# Patient Record
Sex: FEMALE | Race: WHITE | NOT HISPANIC OR LATINO | Employment: OTHER | URBAN - METROPOLITAN AREA
[De-identification: names, ages, dates, MRNs, and addresses within clinical notes are randomized per-mention and may not be internally consistent; named-entity substitution may affect disease eponyms.]

---

## 2017-03-22 RX ORDER — METFORMIN HYDROCHLORIDE 500 MG/1
1000 TABLET ORAL 2 TIMES DAILY WITH MEALS
Qty: 120 TABLET | Refills: 3 | Status: SHIPPED | OUTPATIENT
Start: 2017-03-22

## 2017-05-31 ENCOUNTER — OFFICE VISIT (OUTPATIENT)
Dept: GYNECOLOGIC ONCOLOGY | Facility: CLINIC | Age: 52
End: 2017-05-31
Payer: COMMERCIAL

## 2017-05-31 VITALS
BODY MASS INDEX: 35.94 KG/M2 | HEART RATE: 96 BPM | DIASTOLIC BLOOD PRESSURE: 55 MMHG | SYSTOLIC BLOOD PRESSURE: 113 MMHG | WEIGHT: 184 LBS

## 2017-05-31 DIAGNOSIS — D07.1 VIN III (VULVAR INTRAEPITHELIAL NEOPLASIA III): Primary | ICD-10-CM

## 2017-05-31 PROCEDURE — 99999 PR PBB SHADOW E&M-EST. PATIENT-LVL II: CPT | Mod: PBBFAC,,, | Performed by: OBSTETRICS & GYNECOLOGY

## 2017-05-31 PROCEDURE — 99214 OFFICE O/P EST MOD 30 MIN: CPT | Mod: S$GLB,,, | Performed by: OBSTETRICS & GYNECOLOGY

## 2017-05-31 NOTE — PROGRESS NOTES
Subjective:      Patient ID: Roseann Lemon is a 51 y.o. female.    Chief Complaint: Severe vulvar dysplasia (6 mth f/u)      HPI  Here today for 6 month f/u.  H/O CIS vulva.  Reexcision was normal.  Reports she has been doing well.  No new lesions or vulvar irritation  Review of Systems   Constitutional: Negative for activity change, appetite change, chills, fatigue and fever.   HENT: Negative for hearing loss, mouth sores, nosebleeds, sore throat and tinnitus.    Eyes: Negative for visual disturbance.   Respiratory: Negative for cough, chest tightness, shortness of breath and wheezing.    Cardiovascular: Negative for chest pain and leg swelling.   Gastrointestinal: Negative for abdominal distention, abdominal pain, blood in stool, constipation, diarrhea, nausea and vomiting.   Genitourinary: Negative for dysuria, flank pain, frequency, hematuria, pelvic pain, vaginal bleeding, vaginal discharge and vaginal pain.   Musculoskeletal: Negative for arthralgias and back pain.   Skin: Negative for rash.   Neurological: Negative for dizziness, seizures, syncope, weakness and numbness.   Hematological: Does not bruise/bleed easily.   Psychiatric/Behavioral: Negative for confusion and sleep disturbance. The patient is not nervous/anxious.         Objective:   Physical Exam:   Constitutional: She appears well-developed and well-nourished. No distress.    HENT:   Head: Normocephalic and atraumatic.    Eyes: No scleral icterus.    Neck: Normal range of motion. Neck supple.    Cardiovascular: Normal rate and intact distal pulses.  Exam reveals no cyanosis and no edema.     Pulmonary/Chest: Effort normal. No respiratory distress. She exhibits no tenderness.        Abdominal: Soft. She exhibits no distension, no fluid wave, no ascites and no mass. There is no tenderness. There is no rebound and no guarding. No hernia.     Genitourinary: Rectum normal and vagina normal. Pelvic exam was performed with patient supine. There is  no rash, tenderness or lesion on the right labia. There is no rash, tenderness or lesion on the left labia. Uterus is absent. There is an absent adnexa. Right adnexum displays no mass, no tenderness and no fullness. Left adnexum displays no mass, no tenderness and no fullness. No bleeding or unspecified prolapse of vaginal walls in the vagina. No vaginal discharge found. Vaginal cuff normal.Labial bartholins normal.Cervix exhibits absence.              Lymphadenopathy:     She has no cervical adenopathy.        Right: No inguinal adenopathy present.        Left: No inguinal adenopathy present.     Skin: No cyanosis.        Assessment:     1. BEN III (vulvar intraepithelial neoplasia III)        Plan:       Doing well post-op.  RTC 6 months

## 2017-08-07 ENCOUNTER — OFFICE VISIT (OUTPATIENT)
Dept: DIABETES | Facility: CLINIC | Age: 52
End: 2017-08-07
Payer: COMMERCIAL

## 2017-08-07 VITALS
SYSTOLIC BLOOD PRESSURE: 102 MMHG | BODY MASS INDEX: 36.98 KG/M2 | DIASTOLIC BLOOD PRESSURE: 80 MMHG | HEIGHT: 61 IN | WEIGHT: 195.88 LBS

## 2017-08-07 DIAGNOSIS — E11.9 DIABETES MELLITUS TYPE 2 WITHOUT RETINOPATHY: Primary | ICD-10-CM

## 2017-08-07 DIAGNOSIS — E66.9 OBESITY, UNSPECIFIED OBESITY SEVERITY, UNSPECIFIED OBESITY TYPE: ICD-10-CM

## 2017-08-07 DIAGNOSIS — I15.2 HYPERTENSION ASSOCIATED WITH DIABETES: ICD-10-CM

## 2017-08-07 DIAGNOSIS — E11.59 HYPERTENSION ASSOCIATED WITH DIABETES: ICD-10-CM

## 2017-08-07 LAB — GLUCOSE SERPL-MCNC: 195 MG/DL (ref 70–110)

## 2017-08-07 PROCEDURE — 99999 PR PBB SHADOW E&M-EST. PATIENT-LVL IV: CPT | Mod: PBBFAC,,, | Performed by: NURSE PRACTITIONER

## 2017-08-07 PROCEDURE — 82948 REAGENT STRIP/BLOOD GLUCOSE: CPT | Mod: S$GLB,,, | Performed by: NURSE PRACTITIONER

## 2017-08-07 PROCEDURE — 3044F HG A1C LEVEL LT 7.0%: CPT | Mod: S$GLB,,, | Performed by: NURSE PRACTITIONER

## 2017-08-07 PROCEDURE — 99215 OFFICE O/P EST HI 40 MIN: CPT | Mod: S$GLB,,, | Performed by: NURSE PRACTITIONER

## 2017-08-07 PROCEDURE — 3008F BODY MASS INDEX DOCD: CPT | Mod: S$GLB,,, | Performed by: NURSE PRACTITIONER

## 2017-08-07 RX ORDER — PIOGLITAZONEHYDROCHLORIDE 15 MG/1
15 TABLET ORAL DAILY
COMMUNITY

## 2017-08-07 NOTE — PROGRESS NOTES
"PCP: Norman Crawford MD    Subjective:     Chief Complaint: Diabetes, establish [facility]    HISTORY OF PRESENT ILLNESS: 51 year old  female presenting to establish care for diabetes. Patient has had Type II diabetes since 2012 and has the following complications: none. She  is to be enrolled in diabetes education classes.  Has been on Invokana, glipizide but could not tolerate.  Blood glucose testing is performed regularly.  Per meter, fasting BG are 104 - 159.  She denies any recent hospital admissions, emergency room visits, or hypoglycemia.     Height: 5' 1" (154.9 cm)  //  Weight: 88.9 kg (195 lb 14.1 oz), Body mass index is 37.01 kg/m².  Home Blood Glucose reading this AM: 163 mg/dl fasting.  Her blood sugar in clinic today is: 195 mg/dl at 7:58 pm      Labs Reviewed. ADA recommends A1C of less than 7 %. Her most recent A1C is:     Lab Results   Component Value Date    HGBA1C 6.4 (H) 09/07/2016      DM MEDICATIONS:   Januvia 100 mg daily  Jardiance 25 mg daily  Actos 15 mg daily  Metformin 500 mg, 2 tabs BID    Review of Systems   Constitutional: Negative for appetite change, diaphoresis, fatigue and unexpected weight change.   HENT: Negative for congestion, hearing loss, rhinorrhea, sneezing and sore throat.    Eyes: Negative for visual disturbance.   Respiratory: Negative for cough, shortness of breath and wheezing.    Cardiovascular: Negative for leg swelling.   Gastrointestinal: Negative for abdominal pain, constipation, diarrhea, nausea and vomiting.   Endocrine: Positive for polydipsia (dry mouth). Negative for cold intolerance, heat intolerance, polyphagia and polyuria.   Genitourinary: Negative for difficulty urinating, dysuria and urgency.   Skin: Negative for color change, pallor and wound.   Neurological: Negative for dizziness, seizures, syncope, numbness and headaches.   Psychiatric/Behavioral: Negative for confusion and decreased concentration. The patient is not nervous/anxious.      "   STANDARDS OF CARE:  Current Ophthalmologist / Optometrist: Dr. Ellington, Last exam as noted 10 / 2016.   Current Podiatrist: None.   Recommend regular exams and denies gums bleeding.    ACTIVITY LEVEL: Rarely Active  EXERCISE:  none  MEAL PLANNING: Patient reports number of meals per day to be 3 and number of snacks per day to be 3, such cucumbers, tomatoes, or fruit.  Breakfast can be piper, biscuit OR sausage biscuit OR scalloped potatoes.   Lunch can be left overs.  Dinner can be red beans, rice OR chicken and dumplings OR spaghetti.  Beverages include coke or water. Patient is encouraged to consume 45 - 60 grams of carbohydrates in each meal, and 1800 k / miguel per day.      Per dietary recall, patient is not limiting carbohydrates, saturated fats and sodium.     BLOOD GLUCOSE TESTING:  [email or web address].   SOCIAL HISTORY: .  Lives with family. Retired .  Tobacco use, at least 1 ppd.    Objective:      Physical Exam   Constitutional: She is oriented to person, place, and time. She appears well-developed and well-nourished.   HENT:   Head: Normocephalic and atraumatic.   Eyes: EOM are normal. Pupils are equal, round, and reactive to light.   Neck: Normal range of motion. No tracheal deviation present.   Cardiovascular: Normal rate, regular rhythm and intact distal pulses.  Exam reveals no friction rub.    No murmur heard.  Pulses:       Dorsalis pedis pulses are 2+ on the right side, and 2+ on the left side.   Pulmonary/Chest: Effort normal and breath sounds normal. She has no wheezes.   Abdominal: Soft. Bowel sounds are normal. She exhibits no distension. There is no tenderness.   Musculoskeletal: Normal range of motion. She exhibits no edema.        Left foot: There is no deformity.   Feet:   Right Foot:   Protective Sensation: 6 sites tested. 6 sites sensed.   Skin Integrity: Negative for ulcer, blister, skin breakdown, erythema, warmth, callus or dry skin.   Left Foot:   Protective Sensation:  6 sites tested. 6 sites sensed.   Skin Integrity: Negative for ulcer, blister, skin breakdown, erythema, warmth, callus or dry skin.   Neurological: She is alert and oriented to person, place, and time.   Skin: Skin is warm and dry. No rash noted.   Psychiatric: She has a normal mood and affect. Her behavior is normal. Judgment and thought content normal.       Assessment / Plan:     1.) Diabetes mellitus type 2 without retinopathy  Comments:  - Continue metformin 500 mg, 2 tabs BID.  Continue Jardiance 25 mg daily.  She admits to staying hydrated, denies symptoms of bladder or yeast infection.  Continue Actos 15 mg daily.  Continue Januvia 100 mg daily.  She has been taking her Januvia at  bedtime, rather than in the morning.  She agrees to start taking Januvia in the am.  Diabetes group classes were scheduled.  We discussed switching from Januvia to a GLP-1, such as Trulicity or Victoza.  Patient would like to continue with Januvia for the next 3  weeks and if not improvement in fasting BG, will switch to GLP-1.  She is going decrease her intake of sugary beverages, such as coke.     Orders:  -     POCT glucose    2.) Hypertension associated with diabetes - continue Lisinopril-HCT    3.) Obesity, unspecified obesity severity, unspecified obesity type    Additional Plan Details:    1.) The time was inaccurate on meter, so reset the time. Patient was instructed to monitor blood glucose 2 - 3 x daily, fasting and ac dinner or at bedtime. Discussed ADA goal for fasting blood sugar, 80 - 130 mg/dL; pp blood sugars below 180 mg/dl. Also, discussed prevention of hypoglycemia and the need to adjust goals to higher levels if persistent hypoglycemia.  Reminded to bring blood glucose records or meter to each visit for review.  2.) Reviewed pathophysiology of diabetes, complications related to the disease, importance of annual dilated eye exam and daily foot examination.  3.) We discussed the ADA recommendations: Hemoglobin  A1c below 7.0 %. All patients with diabetes should be on statins unless contraindicated.  ACE or ARB therapy if not contraindicated.    4.) Meal planning teaching: Carbohydrate definition - one serving is 15 gms. Carbohydrate spacing - carbohydrates should be spaced into approximately 3 meals with 2 snacks ( of one carbohydrate ) between meals or at bedtime. Increase vegetable intake to 2 or more cups of vegetables per day as well as 2 fruit servings.  Recommended low saturated fat, low sodium diet to aid in control of hypertension and cholesterol.  5.) Discussed activity, benefits, methods, and precautions.  Recommended patient start or continue some form of exercise and increase as tolerated to 30 minutes per day to facilitate weight loss and aid in control of BGs.  6.) Return to clinic in 3 months for follow up. She was explained the above plan and given opportunity to ask questions.  Advised to call clinic with any further questions or concerns.    Loida Reardon, AXEL-C, CDE    A total of 60 minutes was spent in face to face time, of which over 50 % was spent in counseling patient on disease process, complications, treatment, and side effects of medications.

## 2017-08-22 ENCOUNTER — CLINICAL SUPPORT (OUTPATIENT)
Dept: DIABETES | Facility: CLINIC | Age: 52
End: 2017-08-22
Payer: COMMERCIAL

## 2017-08-22 DIAGNOSIS — E11.9 TYPE 2 DIABETES MELLITUS WITHOUT COMPLICATION, UNSPECIFIED LONG TERM INSULIN USE STATUS: Primary | ICD-10-CM

## 2017-08-22 PROCEDURE — G0109 DIAB MANAGE TRN IND/GROUP: HCPCS | Mod: S$GLB,,, | Performed by: DIETITIAN, REGISTERED

## 2017-08-22 PROCEDURE — 99999 PR PBB SHADOW E&M-EST. PATIENT-LVL II: CPT | Mod: PBBFAC,,,

## 2017-08-22 NOTE — PROGRESS NOTES
"Diabetes Education  Author: Xochilt Aguiar RD, CDE  Date: 8/22/2017    Diabetes Education Visit  Diabetes Education Record Assessment/Progress: Comprehensive/Group    Diabetes Type  Diabetes Type : Type II    Diabetes History  Diabetes Diagnosis: 3-5 years    Nutrition  Meal Planning: 3 meals per day, drinks regular soda, eats out seldom, snacks between meal ("Coke-aholic")    Monitoring   Monitoring: Freestyle Freedom Lite  Self Monitoring : BID - fasting ranging 120-130s; acd 120s  Blood Glucose Logs: No    Exercise   Frequency: Never    Current Diabetes Treatment   Current Treatment: Diet, Oral Medication    Social History  Preferred Learning Method: Face to Face  Primary Support: Self  Occupation: Retired -  - state   Smoking Status: Current Smoker  Alcohol Use: Never    Barriers to Change  Barriers to Change: None  Learning Challenges : None    Readiness to Learn   Readiness to Learn : Acceptance    Cultural Influences  Cultural Influences: No    Diabetes Education Assessment/Progress  Acute Complications (preventing, detecting, and treating acute complications): Discussion, Competent (verbalizes/demonstrates)  Chronic Complications (preventing, detecting, and treating chronic complications): Discussion, Competent (verbalizes/demonstrates)  Diabetes Disease Process (diabetes disease process and treatment options): Discussion, Competent (verbalizes/demonstrates)  Nutrition (Incorporating nutritional management into one's lifestyle): Discussion, Competent (verbalizes/demonstrates)  Physical Activity (incorporating physical activity into one's lifestyle): Discussion, Competent (verbalizes/demonstrates)  Medications (states correct name, dose, onset, peak, duration, side effects & timing of meds): Discussion, Competent (verbalizes/demonstrates)  Monitoring (monitoring blood glucose/other parameters & using results): Discussion, Competent (verbalizes/demonstrates)  Goal Setting and Problem Solving " (verbalizes behavior change strategies & sets realistic goals): Discussion, Competent (verbalizes/demonstrates)  Behavior Change (developing personal strategies to health & behavior change): Discussion, Comnpetent (verbalizes/demonstrates)  Psychosocial Issues (developing personal srategies to address psychosocial concerns): Discussion, Competent (verbalizes/demonstrates)      Diabetes Care Plan/Intervention  Education Plan/Intervention: Individual Follow-Up DSMT  3 month DCF    Education Units of Time   Time Spent: 120 min      Health Maintenance Topics with due status: Not Due       Topic Last Completion Date    Lipid Panel 09/07/2016    Eye Exam 10/17/2016    Foot Exam 08/07/2017     Health Maintenance Due   Topic Date Due    TETANUS VACCINE  11/04/1984    Pneumococcal PPSV23 (Medium Risk) (1) 11/04/1984    Colonoscopy  11/04/2016    Hemoglobin A1c  03/07/2017    Influenza Vaccine  08/01/2017    Mammogram  09/08/2017

## 2017-08-22 NOTE — PROGRESS NOTES
"Diabetes Education  Author: Xochilt Aguiar RD, CDE  Date: 8/22/2017    Referring Provider: Loida Reardon*  51 y.o. female in clinic today for diabetes education. T2DM diagnosis in 2012. Patient is currently taking Jardiance 25 mg daily, metformin 1000 mg BID, Actos 15 mg daily, Januvia 50 mg daily for diabetes. Last A1c:   A1C:   Hemoglobin A1C   Date Value Ref Range Status   09/07/2016 6.4 (H) 4.8 - 5.6 % Final     Comment:              Pre-diabetes: 5.7 - 6.4           Diabetes: >6.4           Glycemic control for adults with diabetes: <7.0         Diabetes Education Visit  Diabetes Education Record Assessment/Progress: Initial    Diabetes Type  Diabetes Type : Type II    Diabetes History  Diabetes Diagnosis: 3-5 years    Nutrition  Meal Planning: 3 meals per day, drinks regular soda, eats out seldom, snacks between meal ("Coke-aholic")    Monitoring   Monitoring: Freestyle Freedom Lite  Self Monitoring : BID - fasting ranging 120-130s; acd 120s  Blood Glucose Logs: No    Exercise   Frequency: Never    Current Diabetes Treatment   Current Treatment: Diet, Oral Medication    Social History  Preferred Learning Method: Face to Face  Primary Support: Self  Occupation: Retired -  - state   Smoking Status: Current Smoker  Alcohol Use: Never    Barriers to Change  Barriers to Change: None  Learning Challenges : None    Readiness to Learn   Readiness to Learn : Acceptance    Cultural Influences  Cultural Influences: No    Diabetes Education Assessment/Progress  Acute Complications (preventing, detecting, and treating acute complications): Discussion  Chronic Complications (preventing, detecting, and treating chronic complications): Discussion  Diabetes Disease Process (diabetes disease process and treatment options): Discussion  Nutrition (Incorporating nutritional management into one's lifestyle): Discussion  Physical Activity (incorporating physical activity into one's lifestyle): " Discussion  Medications (states correct name, dose, onset, peak, duration, side effects & timing of meds): Discussion  Monitoring (monitoring blood glucose/other parameters & using results): Discussion  Goal Setting and Problem Solving (verbalizes behavior change strategies & sets realistic goals): Discussion  Behavior Change (developing personal strategies to health & behavior change): Discussion  Psychosocial Issues (developing personal srategies to address psychosocial concerns): Discussion    Diabetes Care Plan/Intervention  Education Plan/Intervention: Individual Follow-Up DSMT  3 mo f/u with RD/CDE for continued mgt/support.     Education Units of Time   Time Spent: 60 min      Health Maintenance Topics with due status: Not Due       Topic Last Completion Date    Lipid Panel 09/07/2016    Eye Exam 10/17/2016    Foot Exam 08/07/2017     Health Maintenance Due   Topic Date Due    TETANUS VACCINE  11/04/1984    Pneumococcal PPSV23 (Medium Risk) (1) 11/04/1984    Colonoscopy  11/04/2016    Hemoglobin A1c  03/07/2017    Influenza Vaccine  08/01/2017    Mammogram  09/08/2017

## 2017-09-12 ENCOUNTER — TELEPHONE (OUTPATIENT)
Dept: INTERNAL MEDICINE | Facility: CLINIC | Age: 52
End: 2017-09-12

## 2017-09-12 NOTE — TELEPHONE ENCOUNTER
----- Message from Jazzmine Krishnamurthy sent at 9/12/2017  7:46 AM CDT -----  Contact: self [phone]  States that she thinks she may have bronchitis and would like to be worked in for an appt for today. Pt refused to see Stephanie Rai and does not want to go to another location. Please call back at [phone]//thank you acc

## 2017-10-10 ENCOUNTER — CLINICAL SUPPORT (OUTPATIENT)
Dept: SMOKING CESSATION | Facility: CLINIC | Age: 52
End: 2017-10-10
Payer: COMMERCIAL

## 2017-10-10 DIAGNOSIS — F17.200 NICOTINE DEPENDENCE: Primary | ICD-10-CM

## 2017-10-10 PROCEDURE — 99407 BEHAV CHNG SMOKING > 10 MIN: CPT | Mod: S$GLB,,,

## 2017-10-23 ENCOUNTER — OFFICE VISIT (OUTPATIENT)
Dept: OPHTHALMOLOGY | Facility: CLINIC | Age: 52
End: 2017-10-23
Payer: COMMERCIAL

## 2017-10-23 DIAGNOSIS — E11.9 DIABETES MELLITUS TYPE 2 WITHOUT RETINOPATHY: Primary | ICD-10-CM

## 2017-10-23 DIAGNOSIS — H25.11 NUCLEAR SCLEROSIS OF RIGHT EYE: ICD-10-CM

## 2017-10-23 PROCEDURE — 92014 COMPRE OPH EXAM EST PT 1/>: CPT | Mod: S$GLB,,, | Performed by: OPHTHALMOLOGY

## 2017-10-23 PROCEDURE — 99999 PR PBB SHADOW E&M-EST. PATIENT-LVL I: CPT | Mod: PBBFAC,,, | Performed by: OPHTHALMOLOGY

## 2017-10-23 NOTE — LETTER
[facility] - Ophthalmology  [address]  [place] [address]  Phone: [phone]  Fax: [phone]   October 23, 2017    Robert Sims Jr., MD  [address] [address]    Patient: Roseann Lemon   MR Number: [id number]   YOB: 1965   Date of Visit: 10/23/2017       Dear Dr. Sims :    Thank you for referring Roseann Lemon to me for evaluation. Here is my assessment and plan of care:            If you have questions, please do not hesitate to call me. I look forward to following Ms. Roseann Lemon along with you.    Sincerely,        Ish Ellington MD       CC  No Recipients         
No

## 2017-10-23 NOTE — PROGRESS NOTES
SUBJECTIVE:   Roseann Lemon is a 51 y.o. female   Corrected distance visual acuity was 20/20 -2 in the right eye and 20/25 in the left eye.   Chief Complaint   Patient presents with    Diabetic Eye Exam     yearly exam. last A1C was 7.1 october 1st. bs 160 this am    Blurred Vision     vision seem to be getting blurrer. would like to discuss laser surgery or catarct surgery        HPI:  HPI     Diabetic Eye Exam    Additional comments: yearly exam. last A1C was 7.1 october 1st. bs 160   this am           Blurred Vision    Additional comments: vision seem to be getting blurrer. would like to   discuss laser surgery or catarct surgery           Comments   1. H/o traumatic mydriasis, cataract, and iritis OD  2. DM no DR x 2012  3. Asthenopia       Last edited by Ynes Liu MA on 10/23/2017  8:08 AM. (History)        Assessment /Plan :  1. Diabetes mellitus type 2 without retinopathy No diabetic retinopathy at this time. Reviewed diabetic eye precautions including avoiding tobacco use,  Good glucose control, and importance of regular follow up.      2. Nuclear sclerosis of right eye Nuclear sclerotic cataract - not visually significant. Observe.         RTC 1 year

## 2018-08-20 ENCOUNTER — OFFICE VISIT (OUTPATIENT)
Dept: OPHTHALMOLOGY | Facility: CLINIC | Age: 53
End: 2018-08-20
Payer: COMMERCIAL

## 2018-08-20 ENCOUNTER — TELEPHONE (OUTPATIENT)
Dept: OPHTHALMOLOGY | Facility: CLINIC | Age: 53
End: 2018-08-20

## 2018-08-20 DIAGNOSIS — H57.04 TRAUMATIC MYDRIASIS: ICD-10-CM

## 2018-08-20 DIAGNOSIS — H25.13 SENILE NUCLEAR CATARACT, BILATERAL: ICD-10-CM

## 2018-08-20 DIAGNOSIS — E11.9 DIABETES MELLITUS TYPE 2 WITHOUT RETINOPATHY: Primary | ICD-10-CM

## 2018-08-20 PROCEDURE — 99999 PR PBB SHADOW E&M-EST. PATIENT-LVL II: CPT | Mod: PBBFAC,,, | Performed by: OPHTHALMOLOGY

## 2018-08-20 PROCEDURE — 92014 COMPRE OPH EXAM EST PT 1/>: CPT | Mod: S$GLB,,, | Performed by: OPHTHALMOLOGY

## 2018-08-20 NOTE — TELEPHONE ENCOUNTER
Pt's last spec Rx is from .  Spoke with pt and advised that current Rx is .  She stepped on her glasses and broke them.  Offered appt for refraction.  She would like to have full DM exam.  Appt scheduled with Dr. Ellington today.

## 2018-08-20 NOTE — TELEPHONE ENCOUNTER
----- Message from Kiara Delgadillo sent at 8/20/2018  8:13 AM CDT -----  Contact: pt  States she needs to get a prescription for her glasses and she needs to get distance between her pupils measured. Please call pt at [phone]. Thank you

## 2018-08-20 NOTE — PROGRESS NOTES
SUBJECTIVE:   Roseann Lemon is a 52 y.o. female   Corrected distance visual acuity was 20/20 -2 in the right eye and 20/20 in the left eye.   Chief Complaint   Patient presents with    Diabetic Eye Exam     yearly dm. broke glasses.         HPI:  HPI     Diabetic Eye Exam      Additional comments: yearly dm. broke glasses.               Comments     1. H/o traumatic mydriasis, cataract, and iritis OD  2. DM no DR x 2012  3. Asthenopia          Last edited by Ynes Souza MA on 8/20/2018  1:51 PM. (History)        Assessment /Plan :  1. Diabetes mellitus type 2 without retinopathy No diabetic retinopathy at this time. Reviewed diabetic eye precautions including avoiding tobacco use,  Good glucose control, and importance of regular follow up.      2. Senile nuclear cataract, bilateral Nuclear sclerotic cataract - not visually significant. Observe.     3. Traumatic mydriasis - Right Eye  -- Condition stable, no therapeutic change required. Monitoring routinely.       RTC 1 year

## 2018-08-20 NOTE — LETTER
[facility] - Ophthalmology  [address] [place] [address]  Phone: [phone]  Fax: [phone]   August 20, 2018    Robert Sims Jr., MD  [address] [address]    Patient: Roseann Lemon   MR Number: [id number]   YOB: 1965   Date of Visit: 8/20/2018       Dear Dr. Sims :    Thank you for referring Roseann Lemon to me for evaluation. Here is my assessment and plan of care:     /   :  1. Diabetes mellitus type 2 without retinopathy No diabetic retinopathy at this time. Reviewed diabetic eye precautions including avoiding tobacco use,  Good glucose control, and importance of regular follow up.      2. Senile nuclear cataract, bilateral Nuclear sclerotic cataract - not visually significant. Observe.     3. Traumatic mydriasis - Right Eye  -- Condition stable, no therapeutic change required. Monitoring routinely.       RTC 1 year                 If you have questions, please do not hesitate to call me. I look forward to following Ms. Roseann Lemon along with you.    Sincerely,        Ish Ellington MD       CC  No Recipients

## 2019-08-22 ENCOUNTER — OFFICE VISIT (OUTPATIENT)
Dept: OPHTHALMOLOGY | Facility: CLINIC | Age: 54
End: 2019-08-22
Payer: COMMERCIAL

## 2019-08-22 DIAGNOSIS — H57.04 TRAUMATIC MYDRIASIS: ICD-10-CM

## 2019-08-22 DIAGNOSIS — H25.011 CATARACT CORTICAL, SENILE, RIGHT: ICD-10-CM

## 2019-08-22 DIAGNOSIS — H25.13 SENILE NUCLEAR CATARACT, BILATERAL: ICD-10-CM

## 2019-08-22 DIAGNOSIS — E11.9 DIABETES MELLITUS TYPE 2 WITHOUT RETINOPATHY: Primary | ICD-10-CM

## 2019-08-22 PROCEDURE — 92014 PR EYE EXAM, EST PATIENT,COMPREHESV: ICD-10-PCS | Mod: S$GLB,,, | Performed by: OPHTHALMOLOGY

## 2019-08-22 PROCEDURE — 99999 PR PBB SHADOW E&M-EST. PATIENT-LVL II: ICD-10-PCS | Mod: PBBFAC,,, | Performed by: OPHTHALMOLOGY

## 2019-08-22 PROCEDURE — 99999 PR PBB SHADOW E&M-EST. PATIENT-LVL II: CPT | Mod: PBBFAC,,, | Performed by: OPHTHALMOLOGY

## 2019-08-22 PROCEDURE — 92014 COMPRE OPH EXAM EST PT 1/>: CPT | Mod: S$GLB,,, | Performed by: OPHTHALMOLOGY

## 2019-08-22 RX ORDER — KETOROLAC TROMETHAMINE 5 MG/ML
1 SOLUTION OPHTHALMIC 4 TIMES DAILY
Qty: 1 BOTTLE | Refills: 2 | Status: SHIPPED | OUTPATIENT
Start: 2019-08-22

## 2019-08-22 RX ORDER — PREDNISOLONE ACETATE 10 MG/ML
1 SUSPENSION/ DROPS OPHTHALMIC 4 TIMES DAILY
Qty: 1 BOTTLE | Refills: 2 | Status: SHIPPED | OUTPATIENT
Start: 2019-08-22

## 2019-08-22 RX ORDER — GATIFLOXACIN 5 MG/ML
1 SOLUTION/ DROPS OPHTHALMIC 2 TIMES DAILY
Qty: 1 BOTTLE | Refills: 2 | Status: SHIPPED | OUTPATIENT
Start: 2019-08-22

## 2019-08-22 NOTE — LETTER
[facility] Ophthalmology  [address] [address]  Phone: [phone]  Fax: [phone]   August 22, 2019    Robert Sims Jr., MD  [address]  [facility] [address]    Patient: Roseann Lemon   MR Number: [id number]   YOB: 1965   Date of Visit: 8/22/2019       Dear Dr. Sims :    Thank you for referring Roseann Lemon to me for evaluation. Here is my assessment and plan of care:            If you have questions, please do not hesitate to call me. I look forward to following Ms. Roseann Lemon along with you.    Sincerely,        Ish Ellington MD       CC  No Recipients

## 2019-08-22 NOTE — PROGRESS NOTES
SUBJECTIVE:   Roseann Lemon is a 53 y.o. female   Corrected distance visual acuity was 20/25 in the right eye and 20/20 -1 in the left eye.   Chief Complaint   Patient presents with    Diabetic Eye Exam        HPI:  HPI     The patient is here for her annual DB eye exam. The patient states her   diabetes have been doing great and her last A1C was 6.1. The patient   states her eyes are doing good and she denies any ocular pain at this   time.    PCP: Robert Sims      1. H/o traumatic mydriasis, cataract, and iritis OD  2. DM no DR x 2012  3. Asthenopia    Last edited by Isablel Ramos on 8/22/2019  9:44 AM. (History)        Assessment /Plan :  1. Diabetes mellitus type 2 without retinopathy No diabetic retinopathy at this time. Reviewed diabetic eye precautions including avoiding tobacco use,  Good glucose control, and importance of regular follow up.      2. Cataract cortical, senile, right  Visually Significant Cataract OD:   Patient reports decreased vision consistent with the clinical amount of lenticular opacity,  which reaches the level of visual significance and affects activities of daily living such as  read. Risks, benefits, and alternatives to cataract surgery were  discussed.  IOL options were discussed, including Premium IOL'S and the associated  side effects and additional patient cost associated with them as well as patient's visual  goals. The pt expressed a desire to proceed with surgery with the potential for some  reasonable degree of visual improvement and was consented.  Risks of loss of vision and eye reviewed as well as possibility of need for spectacle correction after surgery even with premium implants. Verbal and written preop  instructions were provided to the patient.       Pt is interested in traditional monofocal IOL aiming for:    Distance OU and understands that glasses will be generally needed at all times for near vision and often for finer distance correction especially for  higher degrees of astigmatism.       Final visual acuity may be limited by astigmatism and diabetes and mydriasis    Pt wishes to have Phaco/IOL  OD     Requests a Monofocal IOL.    Will aim for distance    Other considerations: None       3. Senile nuclear cataract, bilateral    4. Traumatic mydriasis - Right Eye  -- Condition stable, no therapeutic change required. Monitoring routinely.

## 2019-08-23 ENCOUNTER — TELEPHONE (OUTPATIENT)
Dept: OPHTHALMOLOGY | Facility: CLINIC | Age: 54
End: 2019-08-23

## 2019-08-23 NOTE — TELEPHONE ENCOUNTER
----- Message from Crystal Roberts sent at 8/23/2019  1:48 PM CDT -----  Contact:  [facility]rl[facility]-[phone]        .Type:  Pharmacy Calling to Clarify an RX     Name of Caller:Fatemeh   Pharmacy Name:[facility]   Prescription Name:Gatifloxacin 1 drop right eye 2xday   What do they need to clarify?: pt is allergic to medication on profile. Would like to compare in the pt's chart   Best Call Back Number:[phone]     Additional Information: .   [facility] - [place] - [address]   [address]   [place] [address]   Phone: [phone] Fax: [phone]

## 2019-08-23 NOTE — TELEPHONE ENCOUNTER
Pharmacy doesn't want to fill Rx for gatofloxacin due to pt history of cipro allergy that caused facial swelling.  They want approval or alternative prescribed by Dr. Ellington.  Phoned pt to let her know that we will consult Dr. Ellington on Monday.

## 2019-08-26 ENCOUNTER — TELEPHONE (OUTPATIENT)
Dept: OPHTHALMOLOGY | Facility: CLINIC | Age: 54
End: 2019-08-26

## 2019-08-26 NOTE — TELEPHONE ENCOUNTER
----- Message from Crystal Roberts sent at 8/23/2019  1:48 PM CDT -----  Contact:  [facility]-[phone]        .Type:  Pharmacy Calling to Clarify an RX     Name of Caller:Fatemeh   Pharmacy Name:[facility]   Prescription Name:Gatifloxacin 1 drop right eye 2xday   What do they need to clarify?: pt is allergic to medication on profile. Would like to compare in the pt's chart   Best Call Back Number:[phone]     Additional Information: .   [facility] - [place] - [address]   [address]   [place] [address]   Phone: [phone] Fax: [phone]

## 2019-08-26 NOTE — TELEPHONE ENCOUNTER
Spoke with pharmacist.  Dr. Ellington will substitute Polytrim for Gatofloxacin due to patien't negative reaction to quinolones.

## 2019-08-26 NOTE — TELEPHONE ENCOUNTER
----- Message from Crystal Roberts sent at 8/23/2019  1:48 PM CDT -----  Contact:  [facility]-[phone]        .Type:  Pharmacy Calling to Clarify an RX     Name of Caller:Fatemeh   Pharmacy Name:[facility]   Prescription Name:Gatifloxacin 1 drop right eye 2xday   What do they need to clarify?: pt is allergic to medication on profile. Would like to compare in the pt's chart   Best Call Back Number:[phone]     Additional Information: .   [facility] - [place] - [address]   [address]   [place] [address]   Phone: [phone] Fax: [phone]

## 2019-09-18 ENCOUNTER — OUTSIDE PLACE OF SERVICE (OUTPATIENT)
Dept: ADMINISTRATIVE | Facility: OTHER | Age: 54
End: 2019-09-18
Payer: COMMERCIAL

## 2019-09-18 PROCEDURE — 66984 PR REMOVAL, CATARACT, W/INSRT INTRAOC LENS, W/O ENDO CYCLO: ICD-10-PCS | Mod: RT,,, | Performed by: OPHTHALMOLOGY

## 2019-09-18 PROCEDURE — 66984 XCAPSL CTRC RMVL W/O ECP: CPT | Mod: RT,,, | Performed by: OPHTHALMOLOGY

## 2019-09-19 ENCOUNTER — OFFICE VISIT (OUTPATIENT)
Dept: OPHTHALMOLOGY | Facility: CLINIC | Age: 54
End: 2019-09-19
Payer: COMMERCIAL

## 2019-09-19 DIAGNOSIS — Z98.890 POST-OPERATIVE STATE: Primary | ICD-10-CM

## 2019-09-19 PROCEDURE — 99999 PR PBB SHADOW E&M-EST. PATIENT-LVL II: ICD-10-PCS | Mod: PBBFAC,,, | Performed by: OPHTHALMOLOGY

## 2019-09-19 PROCEDURE — 99024 POSTOP FOLLOW-UP VISIT: CPT | Mod: S$GLB,,, | Performed by: OPHTHALMOLOGY

## 2019-09-19 PROCEDURE — 99999 PR PBB SHADOW E&M-EST. PATIENT-LVL II: CPT | Mod: PBBFAC,,, | Performed by: OPHTHALMOLOGY

## 2019-09-19 PROCEDURE — 99024 PR POST-OP FOLLOW-UP VISIT: ICD-10-PCS | Mod: S$GLB,,, | Performed by: OPHTHALMOLOGY

## 2019-09-25 ENCOUNTER — OFFICE VISIT (OUTPATIENT)
Dept: OPHTHALMOLOGY | Facility: CLINIC | Age: 54
End: 2019-09-25
Payer: COMMERCIAL

## 2019-09-25 DIAGNOSIS — Z98.890 POST-OPERATIVE STATE: Primary | ICD-10-CM

## 2019-09-25 PROCEDURE — 99024 POSTOP FOLLOW-UP VISIT: CPT | Mod: S$GLB,,, | Performed by: OPHTHALMOLOGY

## 2019-09-25 PROCEDURE — 99024 PR POST-OP FOLLOW-UP VISIT: ICD-10-PCS | Mod: S$GLB,,, | Performed by: OPHTHALMOLOGY

## 2019-09-25 PROCEDURE — 99999 PR PBB SHADOW E&M-EST. PATIENT-LVL II: ICD-10-PCS | Mod: PBBFAC,,, | Performed by: OPHTHALMOLOGY

## 2019-09-25 PROCEDURE — 99999 PR PBB SHADOW E&M-EST. PATIENT-LVL II: CPT | Mod: PBBFAC,,, | Performed by: OPHTHALMOLOGY

## 2019-09-25 NOTE — PROGRESS NOTES
SUBJECTIVE:   Roseann Hollingsworth is a 53 y.o. female   Uncorrected distance visual acuity was 20/20 in the right eye and not recorded in the left eye.   Chief Complaint   Patient presents with    Post-op Evaluation        HPI:  HPI     1 week phaco OD per pt doing great no complaints    Last edited by Tequila Sen MA on 9/25/2019  2:59 PM. (History)        Assessment /Plan :  1. Post-operative state Slit lamp exam:  L/L: nl  K: clear, wound sealed  AC: 0 cell and flare  Iris/Lens: IOL centered and stable      IMP:  PO Week 1 S/P Phaco/ IOL OD : Doing well with no evidence of infection or abnormal inflammation.     Plan:  Pt given and instructed in one week PO instructions. D/C zymaxid and start to taper off Ketorlac and Pred Forte 1% weekly. Can resume normal activitites and d/c eye shield. OTC reading glasses can be used until evaluated for final MR. Follow up in one month or PRN pain, redness, vision loss, or other concerns.

## 2019-10-28 ENCOUNTER — OFFICE VISIT (OUTPATIENT)
Dept: OPHTHALMOLOGY | Facility: CLINIC | Age: 54
End: 2019-10-28
Payer: COMMERCIAL

## 2019-10-28 DIAGNOSIS — Z98.890 POST-OPERATIVE STATE: Primary | ICD-10-CM

## 2019-10-28 PROCEDURE — 99999 PR PBB SHADOW E&M-EST. PATIENT-LVL II: ICD-10-PCS | Mod: PBBFAC,,, | Performed by: OPHTHALMOLOGY

## 2019-10-28 PROCEDURE — 99999 PR PBB SHADOW E&M-EST. PATIENT-LVL II: CPT | Mod: PBBFAC,,, | Performed by: OPHTHALMOLOGY

## 2019-10-28 PROCEDURE — 99024 POSTOP FOLLOW-UP VISIT: CPT | Mod: S$GLB,,, | Performed by: OPHTHALMOLOGY

## 2019-10-28 PROCEDURE — 99024 PR POST-OP FOLLOW-UP VISIT: ICD-10-PCS | Mod: S$GLB,,, | Performed by: OPHTHALMOLOGY

## 2019-10-28 NOTE — PROGRESS NOTES
SUBJECTIVE:   Filomena Hollingsworth is a 53 y.o. female   Uncorrected distance visual acuity was 20/20 -1 in the right eye and not recorded in the left eye. Corrected distance visual acuity was not recorded in the right eye and 20/20 in the left eye.   Chief Complaint   Patient presents with    Post-op Evaluation     5 wk s/p phaco OD. doing well, eye feels dry         HPI:  HPI     Post-op Evaluation      Additional comments: 5 wk s/p phaco OD. doing well, eye feels dry               Comments     Pt states she notices a big difference in vision between right eye and   left eye.     1. H/o traumatic mydriasis, cataract, and iritis OD X 2010  PCIOL OD (+25.0 SN60WF Distance)9/18/19   2. DM no DR x 2012  3. Asthenopia          Last edited by Ynes Souza MA on 10/28/2019  9:11 AM. (History)        Assessment /Plan :  1. Post-operative state   Exam:    Slit lamp exam:  L/L: nl  S/C: quiet and uninflamed  K: clear, wound sealed  AC: no cell or flare  Iris/Lens: IOL centered and stable      Assessment:    PO Month 1 S/P Phaco/IOL OD : Doing Well and completing healing process. Final visual correction options discussed and appropriate prescriptions and/or OTC reading glass recommendations offered to patient. Pt desires PAL Rx. Pt instructed to follow up with Dr. Ellington in 6 months for next eye exam or PRN any pain, redness, vision changes or other concerns.

## 2020-06-15 ENCOUNTER — OFFICE VISIT (OUTPATIENT)
Dept: OPHTHALMOLOGY | Facility: CLINIC | Age: 55
End: 2020-06-15
Payer: COMMERCIAL

## 2020-06-15 DIAGNOSIS — E11.9 DIABETES MELLITUS TYPE 2 WITHOUT RETINOPATHY: Primary | ICD-10-CM

## 2020-06-15 DIAGNOSIS — H25.12 NUCLEAR SCLEROSIS OF LEFT EYE: ICD-10-CM

## 2020-06-15 DIAGNOSIS — Z96.1 PSEUDOPHAKIA OF RIGHT EYE: ICD-10-CM

## 2020-06-15 DIAGNOSIS — H52.7 REFRACTIVE ERROR: ICD-10-CM

## 2020-06-15 PROCEDURE — 92014 COMPRE OPH EXAM EST PT 1/>: CPT | Mod: S$GLB,,, | Performed by: OPHTHALMOLOGY

## 2020-06-15 PROCEDURE — 92015 DETERMINE REFRACTIVE STATE: CPT | Mod: S$GLB,,, | Performed by: OPHTHALMOLOGY

## 2020-06-15 PROCEDURE — 99999 PR PBB SHADOW E&M-EST. PATIENT-LVL III: CPT | Mod: PBBFAC,,, | Performed by: OPHTHALMOLOGY

## 2020-06-15 PROCEDURE — 92015 PR REFRACTION: ICD-10-PCS | Mod: S$GLB,,, | Performed by: OPHTHALMOLOGY

## 2020-06-15 PROCEDURE — 99999 PR PBB SHADOW E&M-EST. PATIENT-LVL III: ICD-10-PCS | Mod: PBBFAC,,, | Performed by: OPHTHALMOLOGY

## 2020-06-15 PROCEDURE — 92014 PR EYE EXAM, EST PATIENT,COMPREHESV: ICD-10-PCS | Mod: S$GLB,,, | Performed by: OPHTHALMOLOGY

## 2020-06-15 NOTE — LETTER
[facility]  [address] [facility] [address] [facility] [address]  Phone: [phone]  Fax: [phone]   Chantell 15, 2020    Robert Sims Jr., MD  [address]  [place] [address]    Patient: Filomena Hollingsworth   MR Number: [id number]   YOB: 1965   Date of Visit: 6/15/2020       Dear Dr. Sims :    Thank you for referring Filomena Hollingsworth to me for evaluation. Here is my assessment and plan of care:    Assessment  /Plan :  1. Diabetes mellitus type 2 without retinopathy No diabetic retinopathy at this time. Reviewed diabetic eye precautions including avoiding tobacco use,  Good glucose control, and importance of regular follow up.      2. Nuclear sclerosis of left eye monitor for now   3. Pseudophakia of right eye  -- Condition stable, no therapeutic change required. Monitoring routinely.     4. Refractive error PAL Rx     RTC in 1 year or prn any changes        If you have questions, please do not hesitate to call me. I look forward to following Ms. Filomena Hollingsworth along with you.    Sincerely,        Ish Ellington MD       CC  No Recipients

## 2020-06-15 NOTE — PROGRESS NOTES
SUBJECTIVE  Filomenamaida Hollingsworth is 54 y.o. female  Corrected distance visual acuity was 20/20 -1 in the right eye and 20/20 -1 in the left eye.   Chief Complaint   Patient presents with    Eye Exam          HPI     The patient states that since surgery she has been feeling as though her   eyes cross and she is also seeing shadows out the corner of her eyes. She   experiences aching pains in her eyes when she is sitting watching tv.    PCP: Robert Sims([place])      1. H/o traumatic mydriasis, cataract, and iritis OD X 2010  PCIOL OD (+25.0 SN60WF Distance)9/18/19   2. DM no DR x 2012  3. Asthenopia    Last edited by Bethany Portillo on 6/15/2020  8:43 AM. (History)         Assessment /Plan :  1. Diabetes mellitus type 2 without retinopathy No diabetic retinopathy at this time. Reviewed diabetic eye precautions including avoiding tobacco use,  Good glucose control, and importance of regular follow up.      2. Nuclear sclerosis of left eye monitor for now   3. Pseudophakia of right eye  -- Condition stable, no therapeutic change required. Monitoring routinely.     4. Refractive error PAL Rx     RTC in 1 year or prn any changes

## 2021-04-28 ENCOUNTER — PATIENT MESSAGE (OUTPATIENT)
Dept: RESEARCH | Facility: HOSPITAL | Age: 56
End: 2021-04-28

## 2021-06-17 ENCOUNTER — OFFICE VISIT (OUTPATIENT)
Dept: OPHTHALMOLOGY | Facility: CLINIC | Age: 56
End: 2021-06-17
Payer: COMMERCIAL

## 2021-06-17 DIAGNOSIS — H57.04 TRAUMATIC MYDRIASIS: ICD-10-CM

## 2021-06-17 DIAGNOSIS — Z96.1 PSEUDOPHAKIA OF RIGHT EYE: ICD-10-CM

## 2021-06-17 DIAGNOSIS — H25.12 NUCLEAR SCLEROSIS OF LEFT EYE: ICD-10-CM

## 2021-06-17 DIAGNOSIS — E11.9 DIABETES MELLITUS TYPE 2 WITHOUT RETINOPATHY: Primary | ICD-10-CM

## 2021-06-17 PROCEDURE — 2023F DILAT RTA XM W/O RTNOPTHY: CPT | Mod: S$GLB,,, | Performed by: OPHTHALMOLOGY

## 2021-06-17 PROCEDURE — 92014 COMPRE OPH EXAM EST PT 1/>: CPT | Mod: S$GLB,,, | Performed by: OPHTHALMOLOGY

## 2021-06-17 PROCEDURE — 2023F PR DILATED RETINAL EXAM W/O EVID OF RETINOPATHY: ICD-10-PCS | Mod: S$GLB,,, | Performed by: OPHTHALMOLOGY

## 2021-06-17 PROCEDURE — 99999 PR PBB SHADOW E&M-EST. PATIENT-LVL III: ICD-10-PCS | Mod: PBBFAC,,, | Performed by: OPHTHALMOLOGY

## 2021-06-17 PROCEDURE — 92014 PR EYE EXAM, EST PATIENT,COMPREHESV: ICD-10-PCS | Mod: S$GLB,,, | Performed by: OPHTHALMOLOGY

## 2021-06-17 PROCEDURE — 99999 PR PBB SHADOW E&M-EST. PATIENT-LVL III: CPT | Mod: PBBFAC,,, | Performed by: OPHTHALMOLOGY

## 2022-07-11 ENCOUNTER — OFFICE VISIT (OUTPATIENT)
Dept: OPHTHALMOLOGY | Facility: CLINIC | Age: 57
End: 2022-07-11
Payer: COMMERCIAL

## 2022-07-11 DIAGNOSIS — H57.813 BROW PTOSIS, BILATERAL: ICD-10-CM

## 2022-07-11 DIAGNOSIS — H02.834 DERMATOCHALASIS OF BOTH UPPER EYELIDS: ICD-10-CM

## 2022-07-11 DIAGNOSIS — Z96.1 PSEUDOPHAKIA OF RIGHT EYE: ICD-10-CM

## 2022-07-11 DIAGNOSIS — H02.831 DERMATOCHALASIS OF BOTH UPPER EYELIDS: ICD-10-CM

## 2022-07-11 DIAGNOSIS — E11.9 DIABETES MELLITUS TYPE 2 WITHOUT RETINOPATHY: Primary | ICD-10-CM

## 2022-07-11 DIAGNOSIS — H57.04 TRAUMATIC MYDRIASIS: ICD-10-CM

## 2022-07-11 DIAGNOSIS — H25.12 NUCLEAR SCLEROSIS OF LEFT EYE: ICD-10-CM

## 2022-07-11 PROCEDURE — 92014 COMPRE OPH EXAM EST PT 1/>: CPT | Mod: S$GLB,,, | Performed by: OPHTHALMOLOGY

## 2022-07-11 PROCEDURE — 1159F MED LIST DOCD IN RCRD: CPT | Mod: CPTII,S$GLB,, | Performed by: OPHTHALMOLOGY

## 2022-07-11 PROCEDURE — 99999 PR PBB SHADOW E&M-EST. PATIENT-LVL II: CPT | Mod: PBBFAC,,, | Performed by: OPHTHALMOLOGY

## 2022-07-11 PROCEDURE — 1160F PR REVIEW ALL MEDS BY PRESCRIBER/CLIN PHARMACIST DOCUMENTED: ICD-10-PCS | Mod: CPTII,S$GLB,, | Performed by: OPHTHALMOLOGY

## 2022-07-11 PROCEDURE — 92014 PR EYE EXAM, EST PATIENT,COMPREHESV: ICD-10-PCS | Mod: S$GLB,,, | Performed by: OPHTHALMOLOGY

## 2022-07-11 PROCEDURE — 1160F RVW MEDS BY RX/DR IN RCRD: CPT | Mod: CPTII,S$GLB,, | Performed by: OPHTHALMOLOGY

## 2022-07-11 PROCEDURE — 1159F PR MEDICATION LIST DOCUMENTED IN MEDICAL RECORD: ICD-10-PCS | Mod: CPTII,S$GLB,, | Performed by: OPHTHALMOLOGY

## 2022-07-11 PROCEDURE — 2023F PR DILATED RETINAL EXAM W/O EVID OF RETINOPATHY: ICD-10-PCS | Mod: CPTII,S$GLB,, | Performed by: OPHTHALMOLOGY

## 2022-07-11 PROCEDURE — 4010F PR ACE/ARB THEARPY RXD/TAKEN: ICD-10-PCS | Mod: CPTII,S$GLB,, | Performed by: OPHTHALMOLOGY

## 2022-07-11 PROCEDURE — 2023F DILAT RTA XM W/O RTNOPTHY: CPT | Mod: CPTII,S$GLB,, | Performed by: OPHTHALMOLOGY

## 2022-07-11 PROCEDURE — 4010F ACE/ARB THERAPY RXD/TAKEN: CPT | Mod: CPTII,S$GLB,, | Performed by: OPHTHALMOLOGY

## 2022-07-11 PROCEDURE — 99999 PR PBB SHADOW E&M-EST. PATIENT-LVL II: ICD-10-PCS | Mod: PBBFAC,,, | Performed by: OPHTHALMOLOGY

## 2022-07-11 NOTE — LETTER
[facility]  [address]  [place] [address]  Phone: [phone]  Fax: [phone]   July 11, 2022      No Recipients    Patient: Filomena Hollingsworth   MR Number: [id number]   YOB: 1965   Date of Visit: 7/11/2022       Dear Dr. Yu Recipients :    Thank you for referring Filomena Hollingsworth to me for evaluation. Here is my assessment and plan of care:    Assessment/Plan :  1. Diabetes mellitus type 2 without retinopathy No diabetic retinopathy at this time. Reviewed diabetic eye precautions including avoiding tobacco use,  Good glucose control, and importance of regular follow up.      2. Nuclear sclerosis of left eye - monitor for now   3. Pseudophakia of right eye  -- Condition stable, no therapeutic change required. Monitoring routinely.     4. Traumatic mydriasis - Right Eye - monitor for now   5. Dermatochalasis of both upper eyelids - monitor for now, could consult Dr. Malone prn   6. Brow ptosis, bilateral - monitor for now     RTC in 1 year or prn any changes        If you have questions, please do not hesitate to call me. I look forward to following Ms. Filomena Hollingsworth along with you.    Sincerely,        Ish Ellington MD       CC    No Recipients

## 2022-07-11 NOTE — PROGRESS NOTES
SUBJECTIVE  Filomena Hollingsworth is 56 y.o. female  Corrected distance visual acuity was 20/20 in the right eye and 20/25 in the left eye. Comments: Checked through [facility] .   Chief Complaint   Patient presents with    Diabetic Eye Exam     Patient states most current glasses broke about 4 months ago and needs a new RX for glasses , states they were doing fine prior to breaking.        FBS:WNL per patient  Lab Results       Component                Value               Date                       HGBA1C                   6.4 (H)             09/07/2016                     HPI     Diabetic Eye Exam      Additional comments: Patient states most current glasses broke about 4   months ago and needs a new RX for glasses , states they were doing fine   prior to breaking.        FBS:WNL per patient  Lab Results       Component                Value               Date                       HGBA1C                   6.4 (H)             09/07/2016                         Comments     PCP: Maria Elena Everett      1. H/o traumatic mydriasis, cataract, and iritis OD X 2010   PCIOL OD (+25.0 SN60WF Distance)9/18/19   2. DM no DR x 2012   3. Asthenopia  4. Traumatic mydriasis OD          Last edited by Maddi Lindsey, Patient Care Assistant on 7/11/2022  7:54   AM. (History)         Assessment /Plan :  1. Diabetes mellitus type 2 without retinopathy No diabetic retinopathy at this time. Reviewed diabetic eye precautions including avoiding tobacco use,  Good glucose control, and importance of regular follow up.      2. Nuclear sclerosis of left eye - monitor for now   3. Pseudophakia of right eye  -- Condition stable, no therapeutic change required. Monitoring routinely.     4. Traumatic mydriasis - Right Eye - monitor for now   5. Dermatochalasis of both upper eyelids - monitor for now, could consult Dr. Malone prn   6. Brow ptosis, bilateral - monitor for now     RTC in 1 year or prn any changes

## 2022-07-11 NOTE — LETTER
[facility]  [address] [facility] [address]  Phone: [phone]  Fax: [phone]   July 11, 2022    Maria Elena Everett DO  [address]  [facility] [place] [address]    Patient: Filomena Hollingsworth   MR Number: [id number]   YOB: 1965   Date of Visit: 7/11/2022       Dear Dr. Maria Elena Everett :    Thank you for referring Filomena Hollingsworth to me for evaluation. Here is my assessment and plan of care:    Assessment/Plan :  1. Diabetes mellitus type 2 without retinopathy No diabetic retinopathy at this time. Reviewed diabetic eye precautions including avoiding tobacco use,  Good glucose control, and importance of regular follow up.      2. Nuclear sclerosis of left eye - monitor for now   3. Pseudophakia of right eye  -- Condition stable, no therapeutic change required. Monitoring routinely.     4. Traumatic mydriasis - Right Eye - monitor for now   5. Dermatochalasis of both upper eyelids - monitor for now, could consult Dr. Malone prn   6. Brow ptosis, bilateral - monitor for now     RTC in 1 year or prn any changes        If you have questions, please do not hesitate to call me. I look forward to following Ms. Filomena Hollingsworth along with you.    Sincerely,        Ish Ellington MD       CC  No Recipients

## 2023-02-24 ENCOUNTER — TELEPHONE (OUTPATIENT)
Dept: DIABETES | Facility: CLINIC | Age: 58
End: 2023-02-24
Payer: COMMERCIAL

## 2023-02-24 NOTE — TELEPHONE ENCOUNTER
Attempted to call patient several times to convert NP appt on Monday to virtual. 2 numbers in chart do not work. And one number voice mail was unavailable. I se she is on My Chart will try to reach out via portal to inform her appt is being converted to virtual.

## 2023-02-27 ENCOUNTER — TELEPHONE (OUTPATIENT)
Dept: DIABETES | Facility: CLINIC | Age: 58
End: 2023-02-27
Payer: COMMERCIAL

## 2023-02-27 NOTE — TELEPHONE ENCOUNTER
----- Message from Kirstin Jones sent at 2/27/2023  1:58 PM CST -----  Contact: self/[phone]  Type:  Patient Returning Call    Who Called:Filomena Hollingsworth  Who Left Message for Patient:Kindra Kwok  Does the patient know what this is regarding?:appt  Would the patient rather a call back or a response via [facility]? Call back   Best Call Back Number:[phone]  Additional Information: patient is trying get connected and can not, can someone from staff please try to reach out to her [phone]. Thanks/ar

## 2023-03-28 ENCOUNTER — TELEPHONE (OUTPATIENT)
Dept: DIABETES | Facility: CLINIC | Age: 58
End: 2023-03-28

## 2023-07-13 ENCOUNTER — OFFICE VISIT (OUTPATIENT)
Dept: OPHTHALMOLOGY | Facility: CLINIC | Age: 58
End: 2023-07-13
Payer: COMMERCIAL

## 2023-07-13 DIAGNOSIS — H25.12 NUCLEAR SCLEROSIS OF LEFT EYE: ICD-10-CM

## 2023-07-13 DIAGNOSIS — H57.04 TRAUMATIC MYDRIASIS: ICD-10-CM

## 2023-07-13 DIAGNOSIS — E11.9 DIABETES MELLITUS TYPE 2 WITHOUT RETINOPATHY: Primary | ICD-10-CM

## 2023-07-13 DIAGNOSIS — Z96.1 PSEUDOPHAKIA OF RIGHT EYE: ICD-10-CM

## 2023-07-13 PROCEDURE — 1159F MED LIST DOCD IN RCRD: CPT | Mod: CPTII,S$GLB,, | Performed by: OPHTHALMOLOGY

## 2023-07-13 PROCEDURE — 4010F PR ACE/ARB THEARPY RXD/TAKEN: ICD-10-PCS | Mod: CPTII,S$GLB,, | Performed by: OPHTHALMOLOGY

## 2023-07-13 PROCEDURE — 1160F RVW MEDS BY RX/DR IN RCRD: CPT | Mod: CPTII,S$GLB,, | Performed by: OPHTHALMOLOGY

## 2023-07-13 PROCEDURE — 92014 COMPRE OPH EXAM EST PT 1/>: CPT | Mod: S$GLB,,, | Performed by: OPHTHALMOLOGY

## 2023-07-13 PROCEDURE — 1159F PR MEDICATION LIST DOCUMENTED IN MEDICAL RECORD: ICD-10-PCS | Mod: CPTII,S$GLB,, | Performed by: OPHTHALMOLOGY

## 2023-07-13 PROCEDURE — 99999 PR PBB SHADOW E&M-EST. PATIENT-LVL III: ICD-10-PCS | Mod: PBBFAC,,, | Performed by: OPHTHALMOLOGY

## 2023-07-13 PROCEDURE — 4010F ACE/ARB THERAPY RXD/TAKEN: CPT | Mod: CPTII,S$GLB,, | Performed by: OPHTHALMOLOGY

## 2023-07-13 PROCEDURE — 1160F PR REVIEW ALL MEDS BY PRESCRIBER/CLIN PHARMACIST DOCUMENTED: ICD-10-PCS | Mod: CPTII,S$GLB,, | Performed by: OPHTHALMOLOGY

## 2023-07-13 PROCEDURE — 99999 PR PBB SHADOW E&M-EST. PATIENT-LVL III: CPT | Mod: PBBFAC,,, | Performed by: OPHTHALMOLOGY

## 2023-07-13 PROCEDURE — 2023F DILAT RTA XM W/O RTNOPTHY: CPT | Mod: CPTII,S$GLB,, | Performed by: OPHTHALMOLOGY

## 2023-07-13 PROCEDURE — 92014 PR EYE EXAM, EST PATIENT,COMPREHESV: ICD-10-PCS | Mod: S$GLB,,, | Performed by: OPHTHALMOLOGY

## 2023-07-13 PROCEDURE — 2023F PR DILATED RETINAL EXAM W/O EVID OF RETINOPATHY: ICD-10-PCS | Mod: CPTII,S$GLB,, | Performed by: OPHTHALMOLOGY

## 2023-07-13 NOTE — PROGRESS NOTES
SUBJECTIVE  Filomena Hollingsworth is 57 y.o. female  Uncorrected distance visual acuity was 20/20 in the right eye and 20/25 -1 in the left eye. Uncorrected near visual acuity was J3 in the right eye and J6 in the left eye.   Chief Complaint   Patient presents with    Diabetic Eye Exam     VA is doing well, no changes. No pain or irritation. Not on any gtts.  Diagnosed with diabetes in 2012  Lab Results       Component                Value               Date                       HGBA1C                   6.4 (H)             09/07/2016                             HPI     Diabetic Eye Exam     Additional comments: VA is doing well, no changes. No pain or irritation.   Not on any gtts.  Diagnosed with diabetes in 2012  Lab Results       Component                Value               Date                       HGBA1C                   6.4 (H)             09/07/2016                              Comments    PCP: Maria Elena Everett (BRG needs letters)      1. H/o traumatic mydriasis, cataract, and iritis OD X 2010   PCIOL OD (+25.0 SN60WF Distance)9/18/19     2. DM no DR x 2012   3. Asthenopia  4. Traumatic mydriasis OD  5. Brow Ptosis            Last edited by Misael Bullard on 7/13/2023  8:30 AM.         Assessment /Plan :  1. Diabetes mellitus type 2 without retinopathy No diabetic retinopathy at this time. Reviewed diabetic eye precautions including avoiding tobacco use,  Good glucose control, and importance of regular follow up.      2. Nuclear sclerosis of left eye - monitor for now   3. Pseudophakia of right eye  -- Condition stable, no therapeutic change required. Monitoring routinely.     4. Traumatic mydriasis - Right Eye - monitor for now     RTC in 1 year or prn any changes

## 2024-07-01 ENCOUNTER — TELEPHONE (OUTPATIENT)
Dept: OPHTHALMOLOGY | Facility: CLINIC | Age: 59
End: 2024-07-01
Payer: COMMERCIAL

## 2024-07-01 NOTE — TELEPHONE ENCOUNTER
Called patient to see if he would like sooner appointment time due to availability. No answer. Did not leave vm due to potential availability disappearing.    Starla Cedillo

## 2024-10-03 ENCOUNTER — OFFICE VISIT (OUTPATIENT)
Dept: OPHTHALMOLOGY | Facility: CLINIC | Age: 59
End: 2024-10-03
Payer: COMMERCIAL

## 2024-10-03 DIAGNOSIS — H25.12 NUCLEAR SCLEROSIS OF LEFT EYE: ICD-10-CM

## 2024-10-03 DIAGNOSIS — Z96.1 PSEUDOPHAKIA OF RIGHT EYE: ICD-10-CM

## 2024-10-03 DIAGNOSIS — H57.04 TRAUMATIC MYDRIASIS: ICD-10-CM

## 2024-10-03 DIAGNOSIS — E11.9 DIABETES MELLITUS TYPE 2 WITHOUT RETINOPATHY: Primary | ICD-10-CM

## 2024-10-03 PROCEDURE — 92014 COMPRE OPH EXAM EST PT 1/>: CPT | Mod: S$GLB,,, | Performed by: OPHTHALMOLOGY

## 2024-10-03 PROCEDURE — 1160F RVW MEDS BY RX/DR IN RCRD: CPT | Mod: CPTII,S$GLB,, | Performed by: OPHTHALMOLOGY

## 2024-10-03 PROCEDURE — 99999 PR PBB SHADOW E&M-EST. PATIENT-LVL III: CPT | Mod: PBBFAC,,, | Performed by: OPHTHALMOLOGY

## 2024-10-03 PROCEDURE — 4010F ACE/ARB THERAPY RXD/TAKEN: CPT | Mod: CPTII,S$GLB,, | Performed by: OPHTHALMOLOGY

## 2024-10-03 PROCEDURE — 1159F MED LIST DOCD IN RCRD: CPT | Mod: CPTII,S$GLB,, | Performed by: OPHTHALMOLOGY

## 2024-10-03 PROCEDURE — 2023F DILAT RTA XM W/O RTNOPTHY: CPT | Mod: CPTII,S$GLB,, | Performed by: OPHTHALMOLOGY

## 2024-10-03 NOTE — PROGRESS NOTES
SUBJECTIVE  Filomena Hollingsworth is 58 y.o. female  Uncorrected distance visual acuity was 20/20 in the right eye and 20/30 in the left eye.   Chief Complaint   Patient presents with    Annual Exam     Pt reports for pesudo yearly. Denies any pain or irritation. Va stable. No drops.           HPI     Annual Exam     Additional comments: Pt reports for pesudo yearly. Denies any pain or   irritation. Va stable. No drops.            Comments    1. H/o traumatic mydriasis, cataract, and iritis OD X 2010   PCIOL OD (+25.0 SN60WF Distance)9/18/19     2. DM no DR x 2012   3. Asthenopia  4. Traumatic mydriasis OD  5. Brow Ptosis             Last edited by Russell Newby on 10/3/2024  8:15 AM.         Assessment /Plan :  1. Diabetes mellitus type 2 without retinopathy No diabetic retinopathy at this time. Reviewed diabetic eye precautions including avoiding tobacco use,  Good glucose control, and importance of regular follow up.      2. Nuclear sclerosis of left eye - monitor for now   3. Pseudophakia of right eye  -- Condition stable, no therapeutic change required. Monitoring routinely.     4. Traumatic mydriasis - Right Eye - monitor for now     RTC in 1 year with Dr. Ramirez or prn any changes